# Patient Record
Sex: MALE | Race: WHITE | NOT HISPANIC OR LATINO | Employment: UNEMPLOYED | URBAN - NONMETROPOLITAN AREA
[De-identification: names, ages, dates, MRNs, and addresses within clinical notes are randomized per-mention and may not be internally consistent; named-entity substitution may affect disease eponyms.]

---

## 2020-10-31 ENCOUNTER — HOSPITAL ENCOUNTER (EMERGENCY)
Facility: HOSPITAL | Age: 30
Discharge: HOME | End: 2020-10-31
Attending: EMERGENCY MEDICINE | Admitting: EMERGENCY MEDICINE

## 2020-10-31 VITALS
RESPIRATION RATE: 20 BRPM | DIASTOLIC BLOOD PRESSURE: 74 MMHG | TEMPERATURE: 98.4 F | OXYGEN SATURATION: 99 % | WEIGHT: 215 LBS | HEART RATE: 89 BPM | HEIGHT: 68 IN | BODY MASS INDEX: 32.58 KG/M2 | SYSTOLIC BLOOD PRESSURE: 115 MMHG

## 2020-10-31 DIAGNOSIS — K04.7 DENTAL INFECTION: Primary | ICD-10-CM

## 2020-10-31 PROCEDURE — 99283 EMERGENCY DEPT VISIT LOW MDM: CPT

## 2020-10-31 RX ORDER — HYDROCODONE BITARTRATE AND ACETAMINOPHEN 7.5; 325 MG/1; MG/1
1 TABLET ORAL EVERY 6 HOURS PRN
Status: DISCONTINUED | OUTPATIENT
Start: 2020-10-31 | End: 2020-11-01 | Stop reason: HOSPADM

## 2020-10-31 RX ORDER — HYDROCODONE BITARTRATE AND ACETAMINOPHEN 10; 325 MG/1; MG/1
1 TABLET ORAL EVERY 6 HOURS PRN
Qty: 10 TABLET | Refills: 0 | Status: SHIPPED | OUTPATIENT
Start: 2020-10-31 | End: 2021-05-06

## 2020-10-31 RX ORDER — IBUPROFEN 800 MG/1
800 TABLET ORAL EVERY 8 HOURS PRN
Qty: 12 TABLET | Refills: 0 | Status: SHIPPED | OUTPATIENT
Start: 2020-10-31 | End: 2021-05-06

## 2020-10-31 RX ORDER — CLINDAMYCIN HYDROCHLORIDE 150 MG/1
600 CAPSULE ORAL ONCE
Status: COMPLETED | OUTPATIENT
Start: 2020-10-31 | End: 2020-10-31

## 2020-10-31 RX ORDER — CLINDAMYCIN HYDROCHLORIDE 300 MG/1
300 CAPSULE ORAL EVERY 6 HOURS
Qty: 40 CAPSULE | Refills: 0 | Status: SHIPPED | OUTPATIENT
Start: 2020-10-31 | End: 2020-11-10

## 2020-10-31 RX ADMIN — CLINDAMYCIN HYDROCHLORIDE 600 MG: 150 CAPSULE ORAL at 22:46

## 2020-10-31 RX ADMIN — HYDROCODONE BITARTRATE AND ACETAMINOPHEN 1 TABLET: 7.5; 325 TABLET ORAL at 22:46

## 2020-11-01 NOTE — ED PROVIDER NOTES
"Subjective   Patient is a 30-year-old male presents complaining of dental pain.  He states this is been going on for \"months\", has been coming and going.  He states that he saw a dentist a week or so ago in [facility], who put him on penicillin but that it does not seem to be getting any better.  He has left lower jaw pain, has had a subjective fever.  He denies other symptoms or complaints.  He states that he is in general healthy, has no medical problems, takes no medications.          Review of Systems   Constitutional: Positive for fever. Negative for chills and diaphoresis.   HENT: Positive for dental problem. Negative for ear pain, sore throat, trouble swallowing and voice change.    Eyes: Negative for photophobia and pain.   Respiratory: Negative for shortness of breath, wheezing and stridor.    Cardiovascular: Negative for chest pain and palpitations.   Gastrointestinal: Negative for abdominal distention, abdominal pain, blood in stool, diarrhea, nausea and vomiting.   Endocrine: Negative for polydipsia and polyphagia.   Genitourinary: Negative for difficulty urinating and flank pain.   Musculoskeletal: Negative for back pain, neck pain and neck stiffness.   Skin: Negative for color change and pallor.   Neurological: Negative for seizures, syncope and speech difficulty.   Psychiatric/Behavioral: Negative for confusion.   All other systems reviewed and are negative.      No past medical history on file.    No Known Allergies    No past surgical history on file.    No family history on file.    Social History     Socioeconomic History   • Marital status: Single     Spouse name: Not on file   • Number of children: Not on file   • Years of education: Not on file   • Highest education level: Not on file           Objective   Physical Exam  Vitals signs and nursing note reviewed.   Constitutional:       General: He is not in acute distress.     Appearance: He is well-developed. He is not ill-appearing, " toxic-appearing or diaphoretic.      Comments: Pleasant young male, well-developed and well-nourished, in no apparent acute distress.  He is in good spirits.   HENT:      Head: Normocephalic and atraumatic.      Comments: The left lower incisor is caried anteriorly at its base.  There is gingival erythema and irritation here.  The left lower rear molar is extensively caried.  There is tender left submandibular adenopathy.  There is no fluctuance, there is no palpable fluid collection.  Eyes:      General: No scleral icterus.     Pupils: Pupils are equal, round, and reactive to light.   Neck:      Musculoskeletal: Normal range of motion and neck supple. No neck rigidity.      Trachea: No tracheal deviation.      Comments: Soft, supple, full range painless motion, no meningeal signs.  Cardiovascular:      Rate and Rhythm: Regular rhythm.   Pulmonary:      Effort: Pulmonary effort is normal. No respiratory distress.      Breath sounds: Normal breath sounds.   Chest:      Chest wall: No tenderness.   Abdominal:      General: Bowel sounds are normal.      Palpations: Abdomen is soft.      Tenderness: There is no abdominal tenderness. There is no guarding or rebound.   Musculoskeletal: Normal range of motion.         General: No tenderness.   Skin:     General: Skin is warm and dry.      Coloration: Skin is not pale.   Neurological:      General: No focal deficit present.      Mental Status: He is alert and oriented to person, place, and time.      GCS: GCS eye subscore is 4. GCS verbal subscore is 5. GCS motor subscore is 6.      Sensory: No sensory deficit.      Motor: No abnormal muscle tone.      Coordination: Coordination normal.   Psychiatric:         Behavior: Behavior normal.         Procedures           ED Course  ED Course as of Oct 31 2300   Sat Oct 31, 2020   [address] [facility] #[id number].  In the last year, a prescription for a 10-day supply of Suboxone filled July 20, 2020, another prescription for 10-day supply of  Suboxone filled August 7, 2020.  No other entries.  Patient states that he does not take Suboxone, never has taken it in his life.  States that this must be some sort of mistake.  We discussed his plan of care.  He voices understanding and agreement.    [CM]      ED Course User Index  [CM] Stone Fung MD                                           [facility]    Final diagnoses:   Dental infection             Please note that portions of this note were completed with a voice recognition program.        Stone Fung MD  10/31/20 2603

## 2020-11-01 NOTE — DISCHARGE INSTRUCTIONS
Home to rest.  Drink plenty of fluids.  Take your medication as prescribed.  Half-strength hydrogen peroxide mouthwashes 4 times daily.  Call  oral surgery clinic early Monday morning to schedule their first available appointment.  Return to the emergency department right away if symptoms worsen/any problems.

## 2021-02-25 ENCOUNTER — HOSPITAL ENCOUNTER (EMERGENCY)
Facility: HOSPITAL | Age: 31
Discharge: HOME | End: 2021-02-25
Attending: FAMILY MEDICINE | Admitting: FAMILY MEDICINE

## 2021-02-25 VITALS
HEART RATE: 108 BPM | SYSTOLIC BLOOD PRESSURE: 116 MMHG | DIASTOLIC BLOOD PRESSURE: 67 MMHG | WEIGHT: 205 LBS | OXYGEN SATURATION: 96 % | BODY MASS INDEX: 32.18 KG/M2 | HEIGHT: 67 IN | RESPIRATION RATE: 18 BRPM | TEMPERATURE: 97.3 F

## 2021-02-25 DIAGNOSIS — T50.901A ACCIDENTAL OVERDOSE, INITIAL ENCOUNTER: Primary | ICD-10-CM

## 2021-02-25 DIAGNOSIS — F19.10 SUBSTANCE ABUSE (HCC): ICD-10-CM

## 2021-02-25 LAB
6-ACETYL MORPHINE: NEGATIVE
ALBUMIN SERPL-MCNC: 4.78 G/DL (ref 3.5–5.2)
ALBUMIN/GLOB SERPL: 1.4 G/DL
ALP SERPL-CCNC: 100 U/L (ref 39–117)
ALT SERPL W P-5'-P-CCNC: 110 U/L (ref 1–41)
AMPHET+METHAMPHET UR QL: POSITIVE
ANION GAP SERPL CALCULATED.3IONS-SCNC: 13.1 MMOL/L (ref 5–15)
APTT PPP: 28.5 SECONDS (ref 25.6–35.3)
AST SERPL-CCNC: 102 U/L (ref 1–40)
BACTERIA UR QL AUTO: ABNORMAL /HPF
BARBITURATES UR QL SCN: NEGATIVE
BASOPHILS # BLD AUTO: 0.04 10*3/MM3 (ref 0–0.2)
BASOPHILS NFR BLD AUTO: 0.2 % (ref 0–1.5)
BENZODIAZ UR QL SCN: NEGATIVE
BILIRUB SERPL-MCNC: 0.7 MG/DL (ref 0–1.2)
BILIRUB UR QL STRIP: NEGATIVE
BUN SERPL-MCNC: 12 MG/DL (ref 6–20)
BUN/CREAT SERPL: 8.8 (ref 7–25)
BUPRENORPHINE SERPL-MCNC: NEGATIVE NG/ML
CALCIUM SPEC-SCNC: 9.1 MG/DL (ref 8.6–10.5)
CANNABINOIDS SERPL QL: POSITIVE
CHLORIDE SERPL-SCNC: 97 MMOL/L (ref 98–107)
CLARITY UR: ABNORMAL
CO2 SERPL-SCNC: 24.9 MMOL/L (ref 22–29)
COCAINE UR QL: NEGATIVE
COLOR UR: YELLOW
CREAT SERPL-MCNC: 1.37 MG/DL (ref 0.76–1.27)
DEPRECATED RDW RBC AUTO: 43.4 FL (ref 37–54)
EOSINOPHIL # BLD AUTO: 0.04 10*3/MM3 (ref 0–0.4)
EOSINOPHIL NFR BLD AUTO: 0.2 % (ref 0.3–6.2)
ERYTHROCYTE [DISTWIDTH] IN BLOOD BY AUTOMATED COUNT: 13.2 % (ref 12.3–15.4)
ETHANOL BLD-MCNC: <10 MG/DL (ref 0–10)
ETHANOL UR QL: <0.01 %
GFR SERPL CREATININE-BSD FRML MDRD: 62 ML/MIN/1.73
GLOBULIN UR ELPH-MCNC: 3.3 GM/DL
GLUCOSE SERPL-MCNC: 97 MG/DL (ref 65–99)
GLUCOSE UR STRIP-MCNC: NEGATIVE MG/DL
HCT VFR BLD AUTO: 48.6 % (ref 37.5–51)
HGB BLD-MCNC: 16.2 G/DL (ref 13–17.7)
HGB UR QL STRIP.AUTO: NEGATIVE
HYALINE CASTS UR QL AUTO: ABNORMAL /LPF
IMM GRANULOCYTES # BLD AUTO: 0.12 10*3/MM3 (ref 0–0.05)
IMM GRANULOCYTES NFR BLD AUTO: 0.7 % (ref 0–0.5)
INR PPP: 0.96 (ref 0.9–1.1)
KETONES UR QL STRIP: ABNORMAL
LEUKOCYTE ESTERASE UR QL STRIP.AUTO: NEGATIVE
LIPASE SERPL-CCNC: 30 U/L (ref 13–60)
LYMPHOCYTES # BLD AUTO: 1.09 10*3/MM3 (ref 0.7–3.1)
LYMPHOCYTES NFR BLD AUTO: 6.2 % (ref 19.6–45.3)
MCH RBC QN AUTO: 29.6 PG (ref 26.6–33)
MCHC RBC AUTO-ENTMCNC: 33.3 G/DL (ref 31.5–35.7)
MCV RBC AUTO: 88.8 FL (ref 79–97)
METHADONE UR QL SCN: NEGATIVE
MONOCYTES # BLD AUTO: 1.22 10*3/MM3 (ref 0.1–0.9)
MONOCYTES NFR BLD AUTO: 6.9 % (ref 5–12)
NEUTROPHILS NFR BLD AUTO: 15.11 10*3/MM3 (ref 1.7–7)
NEUTROPHILS NFR BLD AUTO: 85.8 % (ref 42.7–76)
NITRITE UR QL STRIP: NEGATIVE
NRBC BLD AUTO-RTO: 0 /100 WBC (ref 0–0.2)
OPIATES UR QL: NEGATIVE
OXYCODONE UR QL SCN: NEGATIVE
PCP UR QL SCN: NEGATIVE
PH UR STRIP.AUTO: 5.5 [PH] (ref 5–8)
PLATELET # BLD AUTO: 344 10*3/MM3 (ref 140–450)
PMV BLD AUTO: 10.2 FL (ref 6–12)
POTASSIUM SERPL-SCNC: 4.4 MMOL/L (ref 3.5–5.2)
PROT SERPL-MCNC: 8.1 G/DL (ref 6–8.5)
PROT UR QL STRIP: ABNORMAL
PROTHROMBIN TIME: 12.6 SECONDS (ref 11.9–14.1)
RBC # BLD AUTO: 5.47 10*6/MM3 (ref 4.14–5.8)
RBC # UR: ABNORMAL /HPF
REF LAB TEST METHOD: ABNORMAL
SODIUM SERPL-SCNC: 135 MMOL/L (ref 136–145)
SP GR UR STRIP: 1.02 (ref 1–1.03)
SPERM URNS QL MICRO: ABNORMAL /HPF
SQUAMOUS #/AREA URNS HPF: ABNORMAL /HPF
UROBILINOGEN UR QL STRIP: ABNORMAL
WBC # BLD AUTO: 17.62 10*3/MM3 (ref 3.4–10.8)
WBC UR QL AUTO: ABNORMAL /HPF

## 2021-02-25 PROCEDURE — 80307 DRUG TEST PRSMV CHEM ANLYZR: CPT | Performed by: FAMILY MEDICINE

## 2021-02-25 PROCEDURE — 36415 COLL VENOUS BLD VENIPUNCTURE: CPT

## 2021-02-25 PROCEDURE — 82077 ASSAY SPEC XCP UR&BREATH IA: CPT | Performed by: FAMILY MEDICINE

## 2021-02-25 PROCEDURE — 85025 COMPLETE CBC W/AUTO DIFF WBC: CPT | Performed by: FAMILY MEDICINE

## 2021-02-25 PROCEDURE — 80053 COMPREHEN METABOLIC PANEL: CPT | Performed by: FAMILY MEDICINE

## 2021-02-25 PROCEDURE — 81001 URINALYSIS AUTO W/SCOPE: CPT | Performed by: FAMILY MEDICINE

## 2021-02-25 PROCEDURE — 99284 EMERGENCY DEPT VISIT MOD MDM: CPT

## 2021-02-25 PROCEDURE — 85730 THROMBOPLASTIN TIME PARTIAL: CPT | Performed by: FAMILY MEDICINE

## 2021-02-25 PROCEDURE — 85610 PROTHROMBIN TIME: CPT | Performed by: FAMILY MEDICINE

## 2021-02-25 PROCEDURE — 83690 ASSAY OF LIPASE: CPT | Performed by: FAMILY MEDICINE

## 2021-02-25 RX ORDER — SODIUM CHLORIDE 0.9 % (FLUSH) 0.9 %
10 SYRINGE (ML) INJECTION AS NEEDED
Status: DISCONTINUED | OUTPATIENT
Start: 2021-02-25 | End: 2021-02-25 | Stop reason: HOSPADM

## 2021-02-25 RX ADMIN — SODIUM CHLORIDE 1000 ML: 9 INJECTION, SOLUTION INTRAVENOUS at 01:40

## 2021-03-15 ENCOUNTER — BULK ORDERING (OUTPATIENT)
Dept: CASE MANAGEMENT | Facility: OTHER | Age: 31
End: 2021-03-15

## 2021-03-15 DIAGNOSIS — Z23 IMMUNIZATION DUE: ICD-10-CM

## 2021-05-06 ENCOUNTER — OFFICE VISIT (OUTPATIENT)
Dept: FAMILY MEDICINE CLINIC | Facility: CLINIC | Age: 31
End: 2021-05-06

## 2021-05-06 VITALS
TEMPERATURE: 96.9 F | SYSTOLIC BLOOD PRESSURE: 122 MMHG | DIASTOLIC BLOOD PRESSURE: 68 MMHG | WEIGHT: 212.4 LBS | BODY MASS INDEX: 33.34 KG/M2 | OXYGEN SATURATION: 99 % | HEIGHT: 67 IN | HEART RATE: 71 BPM

## 2021-05-06 DIAGNOSIS — F33.0 MILD EPISODE OF RECURRENT MAJOR DEPRESSIVE DISORDER (HCC): Primary | ICD-10-CM

## 2021-05-06 PROCEDURE — 99203 OFFICE O/P NEW LOW 30 MIN: CPT | Performed by: FAMILY MEDICINE

## 2021-05-06 RX ORDER — SERTRALINE HYDROCHLORIDE 100 MG/1
100 TABLET, FILM COATED ORAL DAILY
Qty: 90 TABLET | Refills: 1 | Status: SHIPPED | OUTPATIENT
Start: 2021-05-06 | End: 2022-01-24 | Stop reason: SDUPTHER

## 2021-05-06 RX ORDER — SILDENAFIL 100 MG/1
TABLET, FILM COATED ORAL
COMMUNITY
Start: 2020-12-01

## 2021-05-06 RX ORDER — SERTRALINE HYDROCHLORIDE 100 MG/1
TABLET, FILM COATED ORAL
COMMUNITY
Start: 2021-04-02 | End: 2021-05-06 | Stop reason: SDUPTHER

## 2021-05-06 NOTE — PROGRESS NOTES
Steve Hillman is a 30 y.o. male.   Pt presents today with CC of Depression      History of Present Illness   Patient is a generally healthy 30-year-old male with history of polysubstance abuse.  He had an accidental overdose back in February.  He has been clean 90 days from all illicit substances.  He is no longer drinking alcohol.  He is doing well.  He works out regularly, he works full-time.  He has no concerns.  He takes Zoloft 100 mg daily and would like to continue this medication.         The following portions of the patient's history were reviewed and updated as appropriate: allergies, current medications, past family history, past medical history, past social history, past surgical history and problem list.    Review of Systems   Constitutional: Negative for chills, fever and unexpected weight loss.   HENT: Negative for congestion and sore throat.    Eyes: Negative for blurred vision and visual disturbance.   Respiratory: Negative for cough and wheezing.    Cardiovascular: Negative for chest pain and palpitations.   Gastrointestinal: Negative for abdominal pain and diarrhea.   Endocrine: Negative for cold intolerance and heat intolerance.   Genitourinary: Negative for dysuria.   Musculoskeletal: Negative for arthralgias and neck stiffness.   Neurological: Negative for dizziness, seizures and syncope.   Psychiatric/Behavioral: Negative for self-injury, suicidal ideas and depressed mood.       Objective   Physical Exam  Vitals and nursing note reviewed.   Constitutional:       Appearance: He is well-developed.   HENT:      Head: Normocephalic and atraumatic.      Right Ear: External ear normal.      Left Ear: External ear normal.      Nose: Nose normal.   Eyes:      Conjunctiva/sclera: Conjunctivae normal.      Pupils: Pupils are equal, round, and reactive to light.   Cardiovascular:      Rate and Rhythm: Normal rate and regular rhythm.      Heart sounds: Normal heart sounds.   Pulmonary:       Effort: Pulmonary effort is normal.      Breath sounds: Normal breath sounds.   Abdominal:      General: Bowel sounds are normal.      Palpations: Abdomen is soft.   Musculoskeletal:      Cervical back: Normal range of motion and neck supple.   Skin:     General: Skin is warm and dry.   Neurological:      Mental Status: He is alert and oriented to person, place, and time.   Psychiatric:         Behavior: Behavior normal.           Assessment/Plan   Diagnoses and all orders for this visit:    1. Mild episode of recurrent major depressive disorder (CMS/HCC) (Primary)  -     sertraline (ZOLOFT) 100 MG tablet; Take 1 tablet by mouth Daily.  Dispense: 90 tablet; Refill: 1    We will continue current dose.  The risks and benefits were discussed.  Follow-up in 5 months.  Sooner if needed.

## 2022-01-24 DIAGNOSIS — F33.0 MILD EPISODE OF RECURRENT MAJOR DEPRESSIVE DISORDER: ICD-10-CM

## 2022-01-24 RX ORDER — SERTRALINE HYDROCHLORIDE 100 MG/1
100 TABLET, FILM COATED ORAL DAILY
Qty: 60 TABLET | Refills: 0 | Status: SHIPPED | OUTPATIENT
Start: 2022-01-24 | End: 2022-05-02

## 2022-01-24 NOTE — TELEPHONE ENCOUNTER
He should have run out of this medication a couple months ago.  Please find out if he is still taking it?      Spoke with patient he reports he is still taking.      F/U was scheduled.

## 2022-01-24 NOTE — TELEPHONE ENCOUNTER
He should have run out of this medication a couple months ago.  Please find out if he is still taking it?

## 2022-04-18 ENCOUNTER — OFFICE VISIT (OUTPATIENT)
Dept: UROLOGY | Facility: CLINIC | Age: 32
End: 2022-04-18

## 2022-04-18 VITALS — HEIGHT: 71 IN | WEIGHT: 181.2 LBS | BODY MASS INDEX: 25.37 KG/M2

## 2022-04-18 DIAGNOSIS — N52.1 ERECTILE DYSFUNCTION DUE TO DISEASES CLASSIFIED ELSEWHERE: Primary | ICD-10-CM

## 2022-04-18 PROCEDURE — 99204 OFFICE O/P NEW MOD 45 MIN: CPT | Performed by: UROLOGY

## 2022-04-18 NOTE — PROGRESS NOTES
Erectile Dysfunction Office Visit      Patient Name: Anthony Hillman  : 1990   MRN: [id number]     Chief Complaint:  Erectile Dysfunction.   Chief Complaint   Patient presents with   • Erectile Dysfunction       Referring Provider: Nissa Purvis APRN    History of Present Illness: Anthony Hillman is a 31 y.o. male who presents today with history of erectile dysfunction.  He reports he has had ED for 2-3 years.  He does have a history of steroid abuse 2 years ago.  He has had erection issues since then.  He was on Zoloft for some time but has not taken it for a couple of months.  He was on methamphetamine for some time and was placed on Zoloft for his substance abuse.  He was tapered off his Zoloft without any issue.  He reports poor libido.  He states he has no interest in sex.    He does get an erection but is half flaccid.  He states he is not always able to keep it.  He is sexually active 2-3 times/month.   No further substance abuse issues.    Erections are not painful or curved.    He has taken Viagra in the past which he states worked initially but now is not helping as much.  He is taking it on an empty stomach.  However, he states it was causing some blood pressure issues as well.        Subjective      Review of System: Review of Systems   Constitutional: Negative for fever.   HENT: Negative for facial swelling and sinus pain.    Respiratory: Negative for chest tightness and stridor.    Gastrointestinal: Negative for abdominal pain and diarrhea.   Genitourinary: Negative for frequency and penile swelling.   Musculoskeletal: Negative for gait problem.   Neurological: Negative for speech difficulty and light-headedness.   Psychiatric/Behavioral: Negative for agitation.      I have reviewed the ROS documented by my clinical staff, I have updated appropriately and I agree. Suresh Orellana MD    Past Medical History: History reviewed. No pertinent past medical history.    Past Surgical History: History  "reviewed. No pertinent surgical history.    Family History: History reviewed. No pertinent family history.    Social History:   Social History     Socioeconomic History   • Marital status: Single   Tobacco Use   • Smoking status: Former Smoker     Packs/day: 1.00     Years: 10.00     Pack years: 10.00     Types: Cigarettes     Quit date:      Years since quittin.2   • Smokeless tobacco: Never Used   Substance and Sexual Activity   • Alcohol use: Never   • Drug use: Never   • Sexual activity: Defer       Medications:     Current Outpatient Medications:   •  sertraline (ZOLOFT) 100 MG tablet, Take 1 tablet by mouth Daily., Disp: 60 tablet, Rfl: 0  •  sildenafil (VIAGRA) 100 MG tablet, , Disp: , Rfl:     Allergies:   No Known Allergies        Sexual Health Inventory for Men (PARAS)   Over the past 6 months:     1. How do you rate your confidence that you could get and keep an erection?  1 - Very low   2. When you had erections with sexual                                     stimulation, how often were your erections hard enough for penetration (entering your partner)?  2 - A few times (much less than half the time)   3. During sexual intercourse, how often were you able to maintain your erection after you had penetrated (entered) your partner?  2 - A few times (much less than half the time)   4. During sexual intercourse, how difficult was it to maintain your erection to completion of intercourse?  2 - Very difficult    5. When you attempted sexual intercourse, how often was it satisfactory for you?  1 - Almost never or never    Total Score: 8   The Sexual Health Inventory for Men further classifies ED severity with the following breakpoints:   1-7 (Severe ED) 8-11 (Moderate ED) 12-16 (Mild to Moderate ED) 17-21 (Mild ED)          Objective     Physical Exam:   Vital Signs:   Vitals:    22 1258   Weight: 82.2 kg (181 lb 3.2 oz)   Height: 180.3 cm (71\")     Body mass index is 25.27 kg/m².     Physical " Exam  Vitals and nursing note reviewed.   Constitutional:       General: He is awake. He is not in acute distress.     Appearance: Normal appearance. He is well-developed. He is obese.   HENT:      Head: Normocephalic and atraumatic.      Right Ear: External ear normal.      Left Ear: External ear normal.      Nose: Nose normal.   Eyes:      Conjunctiva/sclera: Conjunctivae normal.   Pulmonary:      Effort: Pulmonary effort is normal.   Abdominal:      General: There is no distension.      Palpations: Abdomen is soft. There is no mass.      Tenderness: There is no abdominal tenderness. There is no right CVA tenderness, left CVA tenderness, guarding or rebound.      Hernia: No hernia is present. There is no hernia in the left inguinal area or right inguinal area.   Genitourinary:     Pubic Area: No rash.       Penis: Normal.       Testes: Normal.      Rectum: No mass or tenderness. Normal anal tone.   Musculoskeletal:      Cervical back: Normal range of motion.   Lymphadenopathy:      Lower Body: No right inguinal adenopathy. No left inguinal adenopathy.   Skin:     General: Skin is warm.   Neurological:      General: No focal deficit present.      Mental Status: He is alert and oriented to person, place, and time.   Psychiatric:         Behavior: Behavior normal. Behavior is cooperative.         Labs:   Hematocrit (%)   Date Value   02/25/2021 48.6       No results found for: PSA    Images:   No Images in the past 120 days found..    Measures:   Tobacco:   Anthony Hillman  reports that he quit smoking about 2 years ago. His smoking use included cigarettes. He has a 10.00 pack-year smoking history. He has never used smokeless tobacco..     Urine Incontinence: Patient reports that he is not currently experiencing any symptoms of urinary incontinence.      Assessment / Plan      Assessment/Plan:   Mr. Hillman is a 31 y.o. male with erectile dysfunction.   It is likely that his erectile dysfunction is from a combination of  issues.  We will check his testosterone, prolactin, and LH.  I will also send a CBC today.  Depending on his results we will likely start him on testosterone replacement therapy.  However, if his testosterone is normal we discussed other options including switching his sildenafil to another medication or injections.  He is going to come back in the morning to have his labs drawn.  I will see him back in 2 weeks.    Diagnoses and all orders for this visit:    1. Erectile dysfunction due to diseases classified elsewhere (Primary)  -     Testosterone  -     Prolactin  -     Luteinizing Hormone  -     CBC Auto Differential         Patient Education:   1.   I discussed treatment options including oral PDE5- medication, intra-urethral suppositories, pharmacologic injections, vacuum erection devices and implantable penile prostheses.    2. The patient was educated about his options.  He was counseled on appropriate use, timing and the need for sexual stimulation.  In addition, he understands not to use this medication with nitrates. He was instructed to take the medication about 1 hour prior to sexual activity.  Side effects were explained to the patient such as headache, visual changes, upset stomach, dizziness or hypotension, and back pain. Lastly, he was instructed to go immediately to his nearest emergency room in the event he developed an erection lasting longer than 3 hours. He voiced understanding of all these instructions and the importance of seeking prompt medical attention for an erection lasting longer than 3 hours.     3. We also discussed association of erectile dysfunction and future coronary events. Erectile dysfunction can be a marker for future coronary events and usually precedes by 2-4 years.  Recommended he discuss EKG and possible stress test with his PCP and following his cholesterol.  Recommended smoking cessation as well.    Follow Up:   Return in about 2 weeks (around 5/2/2022) for Recheck.    I  spent approximately  minutes providing clinical care for this patient; including review of patient's chart and provider documentation, face to face time spent with patient in examination room (obtaining history, performing physical exam, discussing diagnosis and management options), placing orders, and completing patient documentation.     Suresh Orellana MD  [facility]

## 2022-04-19 ENCOUNTER — HOSPITAL ENCOUNTER (EMERGENCY)
Facility: HOSPITAL | Age: 32
Discharge: HOME | End: 2022-04-19
Attending: STUDENT IN AN ORGANIZED HEALTH CARE EDUCATION/TRAINING PROGRAM | Admitting: STUDENT IN AN ORGANIZED HEALTH CARE EDUCATION/TRAINING PROGRAM

## 2022-04-19 VITALS
HEART RATE: 75 BPM | TEMPERATURE: 98.8 F | SYSTOLIC BLOOD PRESSURE: 130 MMHG | BODY MASS INDEX: 28.79 KG/M2 | WEIGHT: 190 LBS | HEIGHT: 68 IN | RESPIRATION RATE: 18 BRPM | DIASTOLIC BLOOD PRESSURE: 89 MMHG | OXYGEN SATURATION: 99 %

## 2022-04-19 DIAGNOSIS — K04.7 DENTAL ABSCESS: Primary | ICD-10-CM

## 2022-04-19 PROCEDURE — 99282 EMERGENCY DEPT VISIT SF MDM: CPT

## 2022-04-19 PROCEDURE — 96372 THER/PROPH/DIAG INJ SC/IM: CPT

## 2022-04-19 PROCEDURE — 25010000002 KETOROLAC TROMETHAMINE PER 15 MG: Performed by: PHYSICIAN ASSISTANT

## 2022-04-19 RX ORDER — AMOXICILLIN AND CLAVULANATE POTASSIUM 875; 125 MG/1; MG/1
1 TABLET, FILM COATED ORAL 2 TIMES DAILY
Qty: 20 TABLET | Refills: 0 | Status: SHIPPED | OUTPATIENT
Start: 2022-04-19 | End: 2022-05-02

## 2022-04-19 RX ORDER — IBUPROFEN 800 MG/1
800 TABLET ORAL EVERY 8 HOURS PRN
Qty: 30 TABLET | Refills: 0 | Status: SHIPPED | OUTPATIENT
Start: 2022-04-19

## 2022-04-19 RX ORDER — KETOROLAC TROMETHAMINE 30 MG/ML
30 INJECTION, SOLUTION INTRAMUSCULAR; INTRAVENOUS ONCE
Status: COMPLETED | OUTPATIENT
Start: 2022-04-19 | End: 2022-04-19

## 2022-04-19 RX ADMIN — KETOROLAC TROMETHAMINE 30 MG: 30 INJECTION, SOLUTION INTRAMUSCULAR; INTRAVENOUS at 15:10

## 2022-04-19 NOTE — ED PROVIDER NOTES
Subjective   31-year-old male with no known past medical history presents to the emergency room with a left lower tooth abscess.  Patient states that is been ongoing for the past week and getting worse.  He states he has a dentist appointment on May 11 to get it extracted but states the pain is so severe he cannot tolerate it.  He does state that he has had some old penicillin at home that he has been taking, but states it is not doing anything for the pain.  Aggravating factors include drinking and eating on left side.  Denies any alleviating factors.  Denies any other complaints or concerns at this time.      History provided by:  Patient   used: No        Review of Systems   Constitutional: Negative.  Negative for fever.   HENT: Positive for dental problem.    Respiratory: Negative.    Cardiovascular: Negative.  Negative for chest pain.   Gastrointestinal: Negative.  Negative for abdominal pain.   Endocrine: Negative.    Genitourinary: Negative.  Negative for dysuria.   Skin: Negative.    Neurological: Negative.    Psychiatric/Behavioral: Negative.    All other systems reviewed and are negative.      No past medical history on file.    No Known Allergies    No past surgical history on file.    No family history on file.    Social History     Socioeconomic History   • Marital status: Single   Tobacco Use   • Smoking status: Former Smoker     Packs/day: 1.00     Years: 10.00     Pack years: 10.00     Types: Cigarettes     Quit date:      Years since quittin.2   • Smokeless tobacco: Never Used   Substance and Sexual Activity   • Alcohol use: Never   • Drug use: Never   • Sexual activity: Defer           Objective   Physical Exam  Vitals and nursing note reviewed.   Constitutional:       General: He is not in acute distress.     Appearance: He is well-developed. He is not diaphoretic.   HENT:      Head: Normocephalic and atraumatic.      Right Ear: External ear normal.      Left Ear:  External ear normal.      Nose: Nose normal.      Mouth/Throat:      Dentition: Dental tenderness, dental caries and dental abscesses present.     Eyes:      Conjunctiva/sclera: Conjunctivae normal.      Pupils: Pupils are equal, round, and reactive to light.   Neck:      Vascular: No JVD.      Trachea: No tracheal deviation.   Cardiovascular:      Rate and Rhythm: Normal rate and regular rhythm.      Heart sounds: Normal heart sounds. No murmur heard.  Pulmonary:      Effort: Pulmonary effort is normal. No respiratory distress.      Breath sounds: Normal breath sounds. No wheezing.   Abdominal:      General: Bowel sounds are normal.      Palpations: Abdomen is soft.      Tenderness: There is no abdominal tenderness.   Musculoskeletal:         General: No deformity. Normal range of motion.      Cervical back: Normal range of motion and neck supple.   Skin:     General: Skin is warm and dry.      Coloration: Skin is not pale.      Findings: No erythema or rash.   Neurological:      Mental Status: He is alert and oriented to person, place, and time.      Cranial Nerves: No cranial nerve deficit.   Psychiatric:         Behavior: Behavior normal.         Thought Content: Thought content normal.         Procedures           ED Course                                                 MDM  Number of Diagnoses or Management Options  Dental abscess: new and does not require workup  Risk of Complications, Morbidity, and/or Mortality  Presenting problems: low  Diagnostic procedures: minimal  Management options: moderate    Patient Progress  Patient progress: stable      Final diagnoses:   Dental abscess       ED Disposition  ED Disposition     ED Disposition   Discharge    Condition   Stable    Comment   --             Tiffanie Marshall, LINDA  [address] [facility] [address]  [phone]    In 2 days  dental abscess         Medication List      New Prescriptions    amoxicillin-clavulanate 875-125 MG per  tablet  Commonly known as: AUGMENTIN  Take 1 tablet by mouth 2 (Two) Times a Day.     ibuprofen 800 MG tablet  Commonly known as: ADVIL,MOTRIN  Take 1 tablet by mouth Every 8 (Eight) Hours As Needed for Moderate Pain .           Where to Get Your Medications      These medications were sent to [facility] [place], [place] - [address] - [phone]  - [phone]   [address], [facility] [address]    Phone: [phone]   · amoxicillin-clavulanate 875-125 MG per tablet  · ibuprofen 800 MG tablet          Aleksander Cardoza, PA-C  04/19/22 1504

## 2022-04-21 ENCOUNTER — LAB (OUTPATIENT)
Dept: UROLOGY | Facility: CLINIC | Age: 32
End: 2022-04-21

## 2022-04-21 LAB
BASOPHILS # BLD AUTO: 0.03 10*3/MM3 (ref 0–0.2)
BASOPHILS NFR BLD AUTO: 0.4 % (ref 0–1.5)
DEPRECATED RDW RBC AUTO: 39.3 FL (ref 37–54)
EOSINOPHIL # BLD AUTO: 0.08 10*3/MM3 (ref 0–0.4)
EOSINOPHIL NFR BLD AUTO: 1.2 % (ref 0.3–6.2)
ERYTHROCYTE [DISTWIDTH] IN BLOOD BY AUTOMATED COUNT: 12.7 % (ref 12.3–15.4)
HCT VFR BLD AUTO: 42.5 % (ref 37.5–51)
HGB BLD-MCNC: 14.6 G/DL (ref 13–17.7)
IMM GRANULOCYTES # BLD AUTO: 0.02 10*3/MM3 (ref 0–0.05)
IMM GRANULOCYTES NFR BLD AUTO: 0.3 % (ref 0–0.5)
LH SERPL-ACNC: 4.06 MIU/ML
LYMPHOCYTES # BLD AUTO: 1.54 10*3/MM3 (ref 0.7–3.1)
LYMPHOCYTES NFR BLD AUTO: 22.7 % (ref 19.6–45.3)
MCH RBC QN AUTO: 29.6 PG (ref 26.6–33)
MCHC RBC AUTO-ENTMCNC: 34.4 G/DL (ref 31.5–35.7)
MCV RBC AUTO: 86.2 FL (ref 79–97)
MONOCYTES # BLD AUTO: 0.76 10*3/MM3 (ref 0.1–0.9)
MONOCYTES NFR BLD AUTO: 11.2 % (ref 5–12)
NEUTROPHILS NFR BLD AUTO: 4.36 10*3/MM3 (ref 1.7–7)
NEUTROPHILS NFR BLD AUTO: 64.2 % (ref 42.7–76)
NRBC BLD AUTO-RTO: 0 /100 WBC (ref 0–0.2)
PLATELET # BLD AUTO: 270 10*3/MM3 (ref 140–450)
PMV BLD AUTO: 11.9 FL (ref 6–12)
PROLACTIN SERPL-MCNC: 12.1 NG/ML (ref 4.04–15.2)
RBC # BLD AUTO: 4.93 10*6/MM3 (ref 4.14–5.8)
TESTOST SERPL-MCNC: 591 NG/DL (ref 249–836)
WBC NRBC COR # BLD: 6.79 10*3/MM3 (ref 3.4–10.8)

## 2022-04-21 PROCEDURE — 84146 ASSAY OF PROLACTIN: CPT | Performed by: UROLOGY

## 2022-04-21 PROCEDURE — 85025 COMPLETE CBC W/AUTO DIFF WBC: CPT | Performed by: UROLOGY

## 2022-04-21 PROCEDURE — 83002 ASSAY OF GONADOTROPIN (LH): CPT | Performed by: UROLOGY

## 2022-04-21 PROCEDURE — 84403 ASSAY OF TOTAL TESTOSTERONE: CPT | Performed by: UROLOGY

## 2022-05-02 ENCOUNTER — OFFICE VISIT (OUTPATIENT)
Dept: UROLOGY | Facility: CLINIC | Age: 32
End: 2022-05-02

## 2022-05-02 VITALS — HEIGHT: 68 IN | BODY MASS INDEX: 28.79 KG/M2 | WEIGHT: 190 LBS

## 2022-05-02 DIAGNOSIS — N52.2 DRUG-INDUCED ERECTILE DYSFUNCTION: Primary | ICD-10-CM

## 2022-05-02 PROCEDURE — 99214 OFFICE O/P EST MOD 30 MIN: CPT | Performed by: UROLOGY

## 2022-05-02 RX ORDER — TADALAFIL 20 MG/1
20 TABLET ORAL AS NEEDED
Qty: 20 TABLET | Refills: 1 | Status: SHIPPED | OUTPATIENT
Start: 2022-05-02

## 2022-05-02 NOTE — PROGRESS NOTES
Erectile Dysfunction Office Visit      Patient Name: Anthony Hillman  : 1990   MRN: [id number]     Chief Complaint:  Erectile Dysfunction.   Chief Complaint   Patient presents with   • Erectile Dysfunction       Referring Provider: No ref. provider found    History of Present Illness: Anthony Hillman is a 31 y.o. male who presents today with history of erectile dysfunction.  He is here to review his labs.  He reports he is overall doing well.    He states he still gets a soft erections but rarely enough for penetration.  He has a history of substance abuse and was on Zoloft for quite some time.  During his last visit we checked his testosterone and was found to be normal.  He denies any other issues at this time.  He reports he has tried Viagra in the past but it did not seem to help very much.      Subjective      Review of System: Review of Systems   Constitutional: Negative for chills and fever.   HENT: Negative for sinus pressure.    Respiratory: Negative for cough.    Cardiovascular: Negative for leg swelling.   Gastrointestinal: Negative for abdominal pain.   Genitourinary: Negative for dysuria, frequency and urgency.   Musculoskeletal: Negative for back pain.   Neurological: Negative for headaches.   Psychiatric/Behavioral: The patient is not nervous/anxious.       I have reviewed the ROS documented by my clinical staff, I have updated appropriately and I agree. Suresh Orellana MD    Past Medical History: History reviewed. No pertinent past medical history.    Past Surgical History: History reviewed. No pertinent surgical history.    Family History: History reviewed. No pertinent family history.    Social History:   Social History     Socioeconomic History   • Marital status: Single   Tobacco Use   • Smoking status: Former Smoker     Packs/day: 1.00     Years: 10.00     Pack years: 10.00     Types: Cigarettes     Quit date:      Years since quittin.3   • Smokeless tobacco: Never Used  "  Substance and Sexual Activity   • Alcohol use: Never   • Drug use: Never   • Sexual activity: Defer       Medications:     Current Outpatient Medications:   •  ibuprofen (ADVIL,MOTRIN) 800 MG tablet, Take 1 tablet by mouth Every 8 (Eight) Hours As Needed for Moderate Pain ., Disp: 30 tablet, Rfl: 0  •  sildenafil (VIAGRA) 100 MG tablet, , Disp: , Rfl:   •  tadalafil (Cialis) 20 MG tablet, Take 1 tablet by mouth As Needed for Erectile Dysfunction., Disp: 20 tablet, Rfl: 1    Allergies:   No Known Allergies    IPSS Questionnaire (AUA-7):  Over the past month…    1)  Incomplete Emptying  How often have you had a sensation of not emptying your bladder?  0 - Not at all   2)  Frequency  How often have you had to urinate less than every two hours? 0 - Not at all   3)  Intermittency  How often have you found you stopped and started again several times when you urinated?  0 - Not at all   4) Urgency  How often have you found it difficult to postpone urination?  0 - Not at all   5) Weak Stream  How often have you had a weak urinary stream?  0 - Not at all   6) Straining  How often have you had to push or strain to begin urination?  0 - Not at all   7) Nocturia  How many times did you typically get up at night to urinate?  0 - None   Total Score:  0   The International Prostate Symptom Score (IPSS) is used to screen, diagnose, track symptoms of benign prostatic hyperplasia (BPH).    0-7 pts (Mild Symptoms)  / 8-19 pts (Moderate) / 20-35 (Severe)    Quality of life due to urinary symptoms:  If you were to spend the rest of your life with your urinary condition the way it is now, how would you feel about that? 0-Delighted   Urine Leakage (Incontinence) 0-No Leakage       Objective     Physical Exam:   Vital Signs:   Vitals:    05/02/22 1307   Weight: 86.2 kg (190 lb)   Height: 172.7 cm (68\")     Body mass index is 28.89 kg/m².     Physical Exam  Vitals and nursing note reviewed.   Constitutional:       General: He is awake. He " is not in acute distress.     Appearance: Normal appearance. He is well-developed. He is obese.   HENT:      Head: Normocephalic and atraumatic.      Right Ear: External ear normal.      Left Ear: External ear normal.      Nose: Nose normal.   Eyes:      Conjunctiva/sclera: Conjunctivae normal.   Pulmonary:      Effort: Pulmonary effort is normal.   Abdominal:      General: There is no distension.      Palpations: Abdomen is soft. There is no mass.      Tenderness: There is no abdominal tenderness. There is no right CVA tenderness, left CVA tenderness, guarding or rebound.      Hernia: No hernia is present. There is no hernia in the left inguinal area or right inguinal area.   Genitourinary:     Pubic Area: No rash.       Rectum: No mass or tenderness. Normal anal tone.   Musculoskeletal:      Cervical back: Normal range of motion.   Lymphadenopathy:      Lower Body: No right inguinal adenopathy. No left inguinal adenopathy.   Skin:     General: Skin is warm.   Neurological:      General: No focal deficit present.      Mental Status: He is alert and oriented to person, place, and time.   Psychiatric:         Behavior: Behavior normal. Behavior is cooperative.         Labs:   Testosterone, Total (ng/dL)   Date Value   04/21/2022 591.00     Prolactin (ng/mL)   Date Value   04/21/2022 12.10     LH (mIU/mL)   Date Value   04/21/2022 4.06     Hematocrit (%)   Date Value   04/21/2022 42.5   02/25/2021 48.6       No results found for: PSA    Images:   No Images in the past 120 days found..    Measures:   Tobacco:   Anthony Hillman  reports that he quit smoking about 2 years ago. His smoking use included cigarettes. He has a 10.00 pack-year smoking history. He has never used smokeless tobacco..      Urine Incontinence: Patient reports that he is not currently experiencing any symptoms of urinary incontinence.      Assessment / Plan      Assessment/Plan:   Mr. Hillman is a 31 y.o. male with erectile dysfunction.  His testosterone  was within normal limits as were his other labs.  I do not think he would benefit from testosterone replacement therapy.  I feel that this is likely a drug-induced erectile dysfunction.  He is also had some substance abuse issues in the past which may have affected his sexual performance mentally as well.  I will try Cialis for now.  I also provided him with several numbers for sexual health therapy.  I strongly encouraged him to seek out counseling.  I will have him follow-up in 3 months.    Diagnoses and all orders for this visit:    1. Drug-induced erectile dysfunction (Primary)  -     tadalafil (Cialis) 20 MG tablet; Take 1 tablet by mouth As Needed for Erectile Dysfunction.  Dispense: 20 tablet; Refill: 1         Patient Education:   1.   I discussed treatment options including oral PDE5- medication, intra-urethral suppositories, pharmacologic injections, vacuum erection devices and implantable penile prostheses.    2. The patient was educated about Cialis.  He was counseled on appropriate use, timing and the need for sexual stimulation.  In addition, he understands not to use this medication with nitrates. He was instructed to take the medication about 1 hour prior to sexual activity.  Side effects were explained to the patient such as headache, visual changes, upset stomach, dizziness or hypotension, and back pain. Lastly, he was instructed to go immediately to his nearest emergency room in the event he developed an erection lasting longer than 3 hours. He voiced understanding of all these instructions and the importance of seeking prompt medical attention for an erection lasting longer than 3 hours.     3. We also discussed association of erectile dysfunction and future coronary events. Erectile dysfunction can be a marker for future coronary events and usually precedes by 2-4 years.  Recommended he discuss EKG and possible stress test with his PC and following his cholesterol.      Follow Up:   Return in  about 3 months (around 8/2/2022).    I spent approximately 25 minutes providing clinical care for this patient; including review of patient's chart and provider documentation, face to face time spent with patient in examination room (obtaining history, performing physical exam, discussing diagnosis and management options), placing orders, and completing patient documentation.     Suresh Orellana MD  [facility]

## 2023-06-05 ENCOUNTER — OFFICE VISIT (OUTPATIENT)
Dept: UROLOGY | Facility: CLINIC | Age: 33
End: 2023-06-05
Payer: COMMERCIAL

## 2023-06-05 VITALS
HEIGHT: 68 IN | BODY MASS INDEX: 32.49 KG/M2 | WEIGHT: 214.4 LBS | SYSTOLIC BLOOD PRESSURE: 109 MMHG | HEART RATE: 71 BPM | DIASTOLIC BLOOD PRESSURE: 65 MMHG

## 2023-06-05 DIAGNOSIS — N52.2 DRUG-INDUCED ERECTILE DYSFUNCTION: ICD-10-CM

## 2023-06-05 PROCEDURE — 99213 OFFICE O/P EST LOW 20 MIN: CPT

## 2023-06-05 PROCEDURE — 84403 ASSAY OF TOTAL TESTOSTERONE: CPT

## 2023-06-05 RX ORDER — LAMOTRIGINE 200 MG/1
TABLET ORAL
COMMUNITY
Start: 2023-05-02

## 2023-06-05 RX ORDER — TADALAFIL 20 MG/1
20 TABLET ORAL AS NEEDED
Qty: 20 TABLET | Refills: 5 | Status: SHIPPED | OUTPATIENT
Start: 2023-06-05

## 2023-06-05 RX ORDER — BUPROPION HYDROCHLORIDE 300 MG/1
TABLET ORAL
COMMUNITY
Start: 2023-05-02

## 2023-06-05 NOTE — PROGRESS NOTES
"Chief Complaint:    Chief Complaint   Patient presents with    Erectile Dysfunction       Vital Signs:   /65 (BP Location: Right arm, Patient Position: Sitting)   Pulse 71   Ht 172.7 cm (68\")   Wt 97.3 kg (214 lb 6.4 oz)   BMI 32.60 kg/m²   Body mass index is 32.6 kg/m².      HPI:  Anthony Hillman is a 32 y.o. male who presents today for follow up    History of Present Illness  Mr. Hillman presents to the clinic for a follow-up.  He was initially seen by Dr. Weir in April 2022 for erectile dysfunction.  He had a proper lab work up at that time and all labs came back normal.  His testosterone was roughly 591.  He had normal LH, FSH, and CBC.  He has tried Viagra in the past but states he has significant headaches and does not wish to take the medication.  Patient was started on by Dr. Weir Tadalafil 20 mg half an hour to an hour prior to sexual activity.  Does report an improvement with maintaining and obtaining erections.  Denies any painful intercourse, painful erections, or painful decoration. He no longer is on Zoloft and currently takes Wellbutrin.  He would like his testosterone rechecked today.  Otherwise he has no other complaints.    Past Medical History:  History reviewed. No pertinent past medical history.    Current Meds:  Current Outpatient Medications   Medication Sig Dispense Refill    buPROPion XL (WELLBUTRIN XL) 300 MG 24 hr tablet       lamoTRIgine (LaMICtal) 200 MG tablet       tadalafil (Cialis) 20 MG tablet Take 1 tablet by mouth As Needed for Erectile Dysfunction. 20 tablet 5    ibuprofen (ADVIL,MOTRIN) 800 MG tablet Take 1 tablet by mouth Every 8 (Eight) Hours As Needed for Moderate Pain . (Patient not taking: Reported on 6/5/2023) 30 tablet 0     No current facility-administered medications for this visit.        Allergies:   No Known Allergies     Past Surgical History:  History reviewed. No pertinent surgical history.    Social History:  Social History     Socioeconomic History    " Marital status: Single   Tobacco Use    Smoking status: Former     Packs/day: 1.00     Years: 10.00     Pack years: 10.00     Types: Cigarettes     Quit date: 2020     Years since quitting: 3.4    Smokeless tobacco: Never   Vaping Use    Vaping Use: Never used   Substance and Sexual Activity    Alcohol use: Never    Drug use: Never    Sexual activity: Defer       Family History:  History reviewed. No pertinent family history.    Review of Systems:  Review of Systems   Constitutional:  Negative for chills, fatigue and fever.   Respiratory:  Negative for cough, shortness of breath and wheezing.    Cardiovascular:  Negative for leg swelling.   Gastrointestinal:  Negative for abdominal pain, nausea and vomiting.   Genitourinary:  Negative for difficulty urinating, dysuria, frequency, penile discharge and scrotal swelling.   Musculoskeletal:  Negative for back pain and joint swelling.   Neurological:  Negative for dizziness and headaches.   Psychiatric/Behavioral:  Negative for confusion.      Physical Exam:  Physical Exam  Constitutional:       General: He is not in acute distress.     Appearance: Normal appearance.   HENT:      Head: Normocephalic and atraumatic.      Nose: Nose normal.      Mouth/Throat:      Mouth: Mucous membranes are moist.   Eyes:      Conjunctiva/sclera: Conjunctivae normal.   Cardiovascular:      Rate and Rhythm: Normal rate and regular rhythm.      Pulses: Normal pulses.      Heart sounds: Normal heart sounds.   Pulmonary:      Effort: Pulmonary effort is normal.      Breath sounds: Normal breath sounds.   Abdominal:      General: Bowel sounds are normal.      Palpations: Abdomen is soft.   Musculoskeletal:         General: Normal range of motion.      Cervical back: Normal range of motion.   Skin:     General: Skin is warm.   Neurological:      General: No focal deficit present.      Mental Status: He is alert and oriented to person, place, and time.   Psychiatric:         Mood and Affect:  Mood normal.         Behavior: Behavior normal.         Thought Content: Thought content normal.         Judgment: Judgment normal.             Recent Image (CT and/or KUB):   CT Abdomen and Pelvis: No results found for this or any previous visit.     CT Stone Protocol: No results found for this or any previous visit.     KUB: No results found for this or any previous visit.       Labs:  Brief Urine Lab Results       None          No visits with results within 3 Month(s) from this visit.   Latest known visit with results is:   Office Visit on 04/18/2022   Component Date Value Ref Range Status    Testosterone, Total 04/21/2022 591.00  249.00 - 836.00 ng/dL Final    Prolactin 04/21/2022 12.10  4.04 - 15.20 ng/mL Final    LH 04/21/2022 4.06  mIU/mL Final    WBC 04/21/2022 6.79  3.40 - 10.80 10*3/mm3 Final    RBC 04/21/2022 4.93  4.14 - 5.80 10*6/mm3 Final    Hemoglobin 04/21/2022 14.6  13.0 - 17.7 g/dL Final    Hematocrit 04/21/2022 42.5  37.5 - 51.0 % Final    MCV 04/21/2022 86.2  79.0 - 97.0 fL Final    MCH 04/21/2022 29.6  26.6 - 33.0 pg Final    MCHC 04/21/2022 34.4  31.5 - 35.7 g/dL Final    RDW 04/21/2022 12.7  12.3 - 15.4 % Final    RDW-SD 04/21/2022 39.3  37.0 - 54.0 fl Final    MPV 04/21/2022 11.9  6.0 - 12.0 fL Final    Platelets 04/21/2022 270  140 - 450 10*3/mm3 Final    Neutrophil % 04/21/2022 64.2  42.7 - 76.0 % Final    Lymphocyte % 04/21/2022 22.7  19.6 - 45.3 % Final    Monocyte % 04/21/2022 11.2  5.0 - 12.0 % Final    Eosinophil % 04/21/2022 1.2  0.3 - 6.2 % Final    Basophil % 04/21/2022 0.4  0.0 - 1.5 % Final    Immature Grans % 04/21/2022 0.3  0.0 - 0.5 % Final    Neutrophils, Absolute 04/21/2022 4.36  1.70 - 7.00 10*3/mm3 Final    Lymphocytes, Absolute 04/21/2022 1.54  0.70 - 3.10 10*3/mm3 Final    Monocytes, Absolute 04/21/2022 0.76  0.10 - 0.90 10*3/mm3 Final    Eosinophils, Absolute 04/21/2022 0.08  0.00 - 0.40 10*3/mm3 Final    Basophils, Absolute 04/21/2022 0.03  0.00 - 0.20 10*3/mm3 Final     Immature Grans, Absolute 04/21/2022 0.02  0.00 - 0.05 10*3/mm3 Final    nRBC 04/21/2022 0.0  0.0 - 0.2 /100 WBC Final        Procedure: None  Procedures     I have reviewed and agree with the above PMH, PSH, FMH, social history, medications, allergies, and labs.     Assessment/Plan:   Problem List Items Addressed This Visit    None  Visit Diagnoses       Drug-induced erectile dysfunction        Relevant Medications    tadalafil (Cialis) 20 MG tablet    Other Relevant Orders    Testosterone            Health Maintenance:   Health Maintenance Due   Topic Date Due    COVID-19 Vaccine (1) Never done    TDAP/TD VACCINES (1 - Tdap) Never done    HEPATITIS C SCREENING  Never done    ANNUAL PHYSICAL  Never done        Smoking Counseling: Former smoker.  Never used smokeless tobacco.    Urine Incontinence: Patient reports that he is not currently experiencing any symptoms of urinary incontinence.    Patient was given instructions and counseling regarding his condition or for health maintenance advice. Please see specific information pulled into the AVS if appropriate.    Patient Education:   Erectile dysfunction -discussed with the patient the pathophysiology of erectile dysfunction.  I explained to the patient that erectile dysfunction is a symptom and no disorder.  I educated the patient that erectile dysfunction is often secondary to significant arterial insufficiency, diabetes mellitus, medications, trauma, low testosterone, or psychological factors.  I discussed with the patient ways to help treat erectile dysfunction which include but are not limited to medications, mechanical devices, penile injections, and penile implants.  Patient has tried sildenafil in the past however has significant side effects and does not wish to continue medication.  He is currently been on tadalafil 20 mg half an hour to an hour prior to sexual activity with improvement.  Advised patient to take this medication on an empty stomach.  Advised  patient go to ER if his erection lasting longer than 2 to 4 hours.  Patient would also like his testosterone checked today in office.  I will call him with results once obtained.  Otherwise I will send in refill the patient's medication and see him back on an as-needed basis.    Visit Diagnoses:    ICD-10-CM ICD-9-CM   1. Drug-induced erectile dysfunction  N52.2 607.84     E980.5       Meds Ordered During Visit:  New Medications Ordered This Visit   Medications    tadalafil (Cialis) 20 MG tablet     Sig: Take 1 tablet by mouth As Needed for Erectile Dysfunction.     Dispense:  20 tablet     Refill:  5       Follow Up Appointment: As needed  No follow-ups on file.      This document has been electronically signed by Fidencio Trevizo PA-C   June 5, 2023 21:16 EDT    Part of this note may be an electronic transcription/translation of spoken language to printed text using the Dragon Dictation System.

## 2023-06-06 LAB — TESTOST SERPL-MCNC: 470 NG/DL (ref 249–836)
